# Patient Record
Sex: FEMALE | Race: WHITE | NOT HISPANIC OR LATINO | Employment: OTHER | RURAL
[De-identification: names, ages, dates, MRNs, and addresses within clinical notes are randomized per-mention and may not be internally consistent; named-entity substitution may affect disease eponyms.]

---

## 2020-12-09 ENCOUNTER — HISTORICAL (OUTPATIENT)
Dept: ADMINISTRATIVE | Facility: HOSPITAL | Age: 70
End: 2020-12-09

## 2020-12-10 LAB
INSULIN SERPL-ACNC: NORMAL U[IU]/ML

## 2023-01-09 ENCOUNTER — OFFICE VISIT (OUTPATIENT)
Dept: GASTROENTEROLOGY | Facility: CLINIC | Age: 73
End: 2023-01-09
Payer: MEDICARE

## 2023-01-09 VITALS
HEIGHT: 66 IN | WEIGHT: 163.19 LBS | SYSTOLIC BLOOD PRESSURE: 126 MMHG | OXYGEN SATURATION: 98 % | BODY MASS INDEX: 26.23 KG/M2 | HEART RATE: 68 BPM | DIASTOLIC BLOOD PRESSURE: 55 MMHG

## 2023-01-09 DIAGNOSIS — K92.1 MELENA: ICD-10-CM

## 2023-01-09 PROCEDURE — 99204 OFFICE O/P NEW MOD 45 MIN: CPT | Mod: S$PBB,,, | Performed by: NURSE PRACTITIONER

## 2023-01-09 PROCEDURE — 99213 OFFICE O/P EST LOW 20 MIN: CPT | Mod: PBBFAC | Performed by: NURSE PRACTITIONER

## 2023-01-09 PROCEDURE — 99204 PR OFFICE/OUTPT VISIT, NEW, LEVL IV, 45-59 MIN: ICD-10-PCS | Mod: S$PBB,,, | Performed by: NURSE PRACTITIONER

## 2023-01-09 RX ORDER — IPRATROPIUM BROMIDE 21 UG/1
SPRAY, METERED NASAL
COMMUNITY
Start: 2023-01-04

## 2023-01-09 RX ORDER — AZELASTINE 1 MG/ML
SPRAY, METERED NASAL
COMMUNITY
Start: 2023-01-04

## 2023-01-09 RX ORDER — SIMVASTATIN 40 MG/1
40 TABLET, FILM COATED ORAL EVERY MORNING
COMMUNITY
Start: 2023-01-03

## 2023-01-09 RX ORDER — LOSARTAN POTASSIUM 50 MG/1
50 TABLET ORAL EVERY MORNING
COMMUNITY
Start: 2022-12-06

## 2023-01-09 NOTE — PROGRESS NOTES
"    Estrella Bryant is a 72 y.o. female here for Hemoptysis (X2 at one time; bright; approx 4 weeks ago) and Stool Color Change (Dark; x2 over a 3 week period; last time was 1 week ago)        PCP: Sameer Stock  Referring Provider: No referring provider defined for this encounter.     HPI:  Presents for report of dark stool and "spitting up blood". States that this happened on two occasions in December. Denies epigastric pain, nausea, and vomiting. Denies reflux, with the exception of eating spicy food x 1. Was evaluated by ENT and per pt had a normal laryngoscopy. States the ENT recommended an EGD. Reports a history of gastric ulcer. No EGD found in [facility]. Appetite is good without weight loss. No dysphagia. Recent labs at [place].    Hemoptysis  Pertinent negatives include no abdominal pain, change in bowel habit, fatigue, fever, nausea or vomiting.       ROS:  Review of Systems   Constitutional:  Negative for appetite change, fatigue, fever and unexpected weight change.   HENT:  Negative for trouble swallowing.    Respiratory:  Positive for hemoptysis.    Gastrointestinal:  Negative for abdominal pain, blood in stool (melena), change in bowel habit, constipation, diarrhea, nausea, vomiting, reflux and change in bowel habit.   Integumentary:  Negative for pallor.   Neurological:  Negative for light-headedness.   Psychiatric/Behavioral:  The patient is not nervous/anxious.         PMHX:  has a past medical history of Bleeding stomach ulcer, Headache, High cholesterol, Hypertension, Skin cancer, and Unspecified rotator cuff tear or rupture of unspecified shoulder, not specified as traumatic.    PSHX:  has a past surgical history that includes Hysterectomy; Biopsy; and Tumor removal.    PFHX: family history includes Breast cancer in her maternal aunt and mother; Colon cancer in her father; Heart attack in her paternal uncle; Heart disease in her father and mother; Hyperlipidemia in her brother; Hypertension in her " "brother, father, mother, and sister; Stroke in her paternal grandmother.    PSlHX:  reports that she has quit smoking. Her smoking use included cigarettes. She has a 20.00 pack-year smoking history. She has never used smokeless tobacco. She reports current alcohol use. She reports that she does not use drugs.        Review of patient's allergies indicates:   Allergen Reactions    Penicillins Rash       Medication List with Changes/Refills   Current Medications    AZELASTINE (ASTELIN) 137 MCG (0.1 %) NASAL SPRAY    SMARTSI Spray(s) Both Nares 1-2 Times Daily PRN    IPRATROPIUM (ATROVENT) 21 MCG (0.03 %) NASAL SPRAY    SMARTSI Spray(s) Both Nares Every 6 Hours PRN    LOSARTAN (COZAAR) 50 MG TABLET    Take 50 mg by mouth every morning.    SIMVASTATIN (ZOCOR) 40 MG TABLET    Take 40 mg by mouth every morning.        Objective Findings:  Vital Signs:  BP (!) 126/55   Pulse 68   Ht 5' 6" (1.676 m)   Wt 74 kg (163 lb 3.2 oz)   SpO2 98%   BMI 26.34 kg/m²  Body mass index is 26.34 kg/m².    Physical Exam:  Physical Exam  Vitals and nursing note reviewed.   Constitutional:       General: She is not in acute distress.     Appearance: Normal appearance.   HENT:      Mouth/Throat:      Mouth: Mucous membranes are moist.   Cardiovascular:      Rate and Rhythm: Normal rate.   Pulmonary:      Effort: Pulmonary effort is normal.      Breath sounds: No wheezing, rhonchi or rales.   Abdominal:      General: Bowel sounds are normal. There is no distension.      Palpations: Abdomen is soft. There is no mass.      Tenderness: There is no abdominal tenderness.   Skin:     General: Skin is warm and dry.      Coloration: Skin is not jaundiced or pale.   Neurological:      Mental Status: She is alert and oriented to person, place, and time.   Psychiatric:         Mood and Affect: Mood normal.        Labs:  No results found for: WBC, HGB, HCT, MCV, RDW, PLT, GRAN, LYMPH, MONO, EOS, BASO  No results found for: NA, K, CL, CO2, GLU, " BUN, CREATININE, CALCIUM, PROT, ALBUMIN, BILITOT, ALKPHOS, AST, ALT      Imaging: No results found.      Assessment:  Estrella Bryant is a 72 y.o. female here with:  1. Melena          Recommendations:  1. Schedule EGD  2. Avoid spicy, greasy foods  Avoid caffeine, citric acid, chocolate, peppermint, and carbonated drinks  Do not lay down within 3 hours of eating  Exercise 150 minutes per week  Increase fluid to 64 ounces daily  Avoid antiinflammatory medications such as motrin, advil, aleve, ibuprofen, and BC powder  3. Request labs [place]  4. Labs below today      Follow up in about 3 months (around 4/9/2023).      Order summary:  Orders Placed This Encounter    CBC Auto Differential    Comprehensive Metabolic Panel    EGD       Thank you for allowing me to participate in the care of Estrella Bryant.      YAW WillettC

## 2023-01-27 ENCOUNTER — HOSPITAL ENCOUNTER (OUTPATIENT)
Dept: GASTROENTEROLOGY | Facility: HOSPITAL | Age: 73
Discharge: HOME | End: 2023-01-27
Attending: NURSE PRACTITIONER
Payer: MEDICARE

## 2023-01-27 ENCOUNTER — ANESTHESIA (OUTPATIENT)
Dept: GASTROENTEROLOGY | Facility: HOSPITAL | Age: 73
End: 2023-01-27
Payer: MEDICARE

## 2023-01-27 ENCOUNTER — ANESTHESIA EVENT (OUTPATIENT)
Dept: GASTROENTEROLOGY | Facility: HOSPITAL | Age: 73
End: 2023-01-27
Payer: MEDICARE

## 2023-01-27 VITALS
RESPIRATION RATE: 15 BRPM | HEART RATE: 66 BPM | BODY MASS INDEX: 26.31 KG/M2 | TEMPERATURE: 98 F | WEIGHT: 163 LBS | SYSTOLIC BLOOD PRESSURE: 151 MMHG | OXYGEN SATURATION: 97 % | DIASTOLIC BLOOD PRESSURE: 63 MMHG

## 2023-01-27 DIAGNOSIS — K92.1 MELENA: ICD-10-CM

## 2023-01-27 DIAGNOSIS — K13.79 BLOOD IN MOUTH OF UNKNOWN SOURCE: Primary | ICD-10-CM

## 2023-01-27 DIAGNOSIS — K44.9 HH (HIATUS HERNIA): ICD-10-CM

## 2023-01-27 DIAGNOSIS — K29.00 ACUTE SUPERFICIAL GASTRITIS WITHOUT HEMORRHAGE: ICD-10-CM

## 2023-01-27 DIAGNOSIS — Z87.11 HISTORY OF GASTRIC ULCER: ICD-10-CM

## 2023-01-27 PROCEDURE — 88342 IMHCHEM/IMCYTCHM 1ST ANTB: CPT | Mod: 26,,, | Performed by: PATHOLOGY

## 2023-01-27 PROCEDURE — 37000009 HC ANESTHESIA EA ADD 15 MINS

## 2023-01-27 PROCEDURE — 43239 EGD BIOPSY SINGLE/MULTIPLE: CPT | Mod: ,,, | Performed by: INTERNAL MEDICINE

## 2023-01-27 PROCEDURE — 43239 PR EGD, FLEX, W/BIOPSY, SGL/MULTI: ICD-10-PCS | Mod: ,,, | Performed by: INTERNAL MEDICINE

## 2023-01-27 PROCEDURE — 63600175 PHARM REV CODE 636 W HCPCS: Performed by: NURSE ANESTHETIST, CERTIFIED REGISTERED

## 2023-01-27 PROCEDURE — 37000008 HC ANESTHESIA 1ST 15 MINUTES

## 2023-01-27 PROCEDURE — 43239 EGD BIOPSY SINGLE/MULTIPLE: CPT | Performed by: INTERNAL MEDICINE

## 2023-01-27 PROCEDURE — D9220A PRA ANESTHESIA: Mod: ,,, | Performed by: NURSE ANESTHETIST, CERTIFIED REGISTERED

## 2023-01-27 PROCEDURE — 27000284 HC CANNULA NASAL: Performed by: NURSE ANESTHETIST, CERTIFIED REGISTERED

## 2023-01-27 PROCEDURE — 88305 TISSUE EXAM BY PATHOLOGIST: CPT | Mod: TC,SUR | Performed by: INTERNAL MEDICINE

## 2023-01-27 PROCEDURE — 88305 TISSUE EXAM BY PATHOLOGIST: CPT | Mod: 26,,, | Performed by: PATHOLOGY

## 2023-01-27 PROCEDURE — 88342 SURGICAL PATHOLOGY: ICD-10-PCS | Mod: 26,,, | Performed by: PATHOLOGY

## 2023-01-27 PROCEDURE — 27201423 OPTIME MED/SURG SUP & DEVICES STERILE SUPPLY

## 2023-01-27 PROCEDURE — 25000003 PHARM REV CODE 250: Performed by: NURSE ANESTHETIST, CERTIFIED REGISTERED

## 2023-01-27 PROCEDURE — D9220A PRA ANESTHESIA: ICD-10-PCS | Mod: ,,, | Performed by: NURSE ANESTHETIST, CERTIFIED REGISTERED

## 2023-01-27 PROCEDURE — 88305 SURGICAL PATHOLOGY: ICD-10-PCS | Mod: 26,,, | Performed by: PATHOLOGY

## 2023-01-27 RX ORDER — PROPOFOL 10 MG/ML
VIAL (ML) INTRAVENOUS
Status: DISCONTINUED | OUTPATIENT
Start: 2023-01-27 | End: 2023-01-27

## 2023-01-27 RX ORDER — FENTANYL CITRATE 50 UG/ML
INJECTION, SOLUTION INTRAMUSCULAR; INTRAVENOUS
Status: DISCONTINUED | OUTPATIENT
Start: 2023-01-27 | End: 2023-01-27

## 2023-01-27 RX ORDER — LIDOCAINE HYDROCHLORIDE 20 MG/ML
INJECTION INTRAVENOUS
Status: DISCONTINUED | OUTPATIENT
Start: 2023-01-27 | End: 2023-01-27

## 2023-01-27 RX ORDER — SODIUM CHLORIDE 0.9 % (FLUSH) 0.9 %
10 SYRINGE (ML) INJECTION
Status: CANCELLED | OUTPATIENT
Start: 2023-01-27

## 2023-01-27 RX ADMIN — PROPOFOL 50 MG: 10 INJECTION, EMULSION INTRAVENOUS at 09:01

## 2023-01-27 RX ADMIN — FENTANYL CITRATE 50 MCG: 50 INJECTION INTRAMUSCULAR; INTRAVENOUS at 09:01

## 2023-01-27 RX ADMIN — LIDOCAINE HYDROCHLORIDE 50 MG: 20 INJECTION, SOLUTION INTRAVENOUS at 09:01

## 2023-01-27 NOTE — ANESTHESIA PREPROCEDURE EVALUATION
2023  Estrella Bryant is a 72 y.o., female.      Pre-op Assessment    I have reviewed the Patient Summary Reports.     I have reviewed the Nursing Notes. I have reviewed the NPO Status.   I have reviewed the Medications.     Review of Systems  Anesthesia Hx:  No problems with previous Anesthesia      Past Medical History:   Diagnosis Date    Bleeding stomach ulcer     Headache     High cholesterol     Hypertension     Skin cancer     Unspecified rotator cuff tear or rupture of unspecified shoulder, not specified as traumatic     left       Past Surgical History:   Procedure Laterality Date    BIOPSY      cone    EYE SURGERY Bilateral     cataract bilateral    HYSTERECTOMY      TUMOR REMOVAL             Social History     Socioeconomic History    Marital status:    Tobacco Use    Smoking status: Former     Packs/day: 1.00     Years: 20.00     Pack years: 20.00     Types: Cigarettes    Smokeless tobacco: Never   Substance and Sexual Activity    Alcohol use: Yes     Comment: rarely    Drug use: Never       Current Outpatient Medications   Medication Sig Dispense Refill    azelastine (ASTELIN) 137 mcg (0.1 %) nasal spray SMARTSI Spray(s) Both Nares 1-2 Times Daily PRN      ipratropium (ATROVENT) 21 mcg (0.03 %) nasal spray SMARTSI Spray(s) Both Nares Every 6 Hours PRN      losartan (COZAAR) 50 MG tablet Take 50 mg by mouth every morning.      simvastatin (ZOCOR) 40 MG tablet Take 40 mg by mouth every morning.       No current facility-administered medications for this encounter.       Review of patient's allergies indicates:   Allergen Reactions    Penicillins Rash and Other (See Comments)     seizure       Physical Exam  General: Well nourished    Airway:  Mallampati: II   Mouth Opening: Normal  TM Distance: Normal  Tongue: Normal  Neck ROM: Normal  ROM    Dental:  Intact        Anesthesia Plan  Type of Anesthesia, risks & benefits discussed:    Anesthesia Type: Gen Natural Airway  Intra-op Monitoring Plan: Standard ASA Monitors  Post Op Pain Control Plan: multimodal analgesia  Induction:  IV  Informed Consent: Informed consent signed with the Patient and all parties understand the risks and agree with anesthesia plan.  All questions answered. Patient consented to blood products? Yes  ASA Score: 2  Day of Surgery Review of History & Physical: H&P Update referred to the surgeon/provider.    Ready For Surgery From Anesthesia Perspective.     .       numerical 0-10

## 2023-01-27 NOTE — ANESTHESIA POSTPROCEDURE EVALUATION
Anesthesia Post Evaluation    Patient: Estrella Bryant    Procedure(s) Performed: endoscopy    Final Anesthesia Type: general      Patient location during evaluation: GI PACU  Patient participation: Yes- Able to Participate  Level of consciousness: awake and alert  Post-procedure vital signs: reviewed and stable  Pain management: adequate  Airway patency: patent    PONV status at discharge: No PONV  Anesthetic complications: no      Cardiovascular status: stable  Respiratory status: unassisted, spontaneous ventilation and room air  Hydration status: euvolemic  Follow-up not needed.          Vitals Value Taken Time   /63 01/27/23 0938   Temp 36.7 °C (98 °F) 01/27/23 0919   Pulse 67 01/27/23 0943   Resp 21 01/27/23 0943   SpO2 98 % 01/27/23 0943   Vitals shown include unvalidated device data.      Event Time   Out of Recovery 09:47:50         Pain/Cindy Score: Cindy Score: 8 (1/27/2023  9:21 AM)

## 2023-01-27 NOTE — H&P
[facility] - Endoscopy  Gastroenterology  H&P    Patient Name: Estrella Bryant  MRN: [id number]  Admission Date: 1/27/2023  Code Status: No Order    Attending Provider: NICK Diallo   Primary Care Physician: Sameer Stock MD  Principal Problem:<principal problem not specified>    Subjective:     History of Present Illness: Pt saw blood in her throat and has history of gastric ulcers; for egd.    Past Medical History:   Diagnosis Date    Bleeding stomach ulcer     Headache     High cholesterol     Hypertension     Skin cancer     Unspecified rotator cuff tear or rupture of unspecified shoulder, not specified as traumatic     left       Past Surgical History:   Procedure Laterality Date    BIOPSY      cone    EYE SURGERY Bilateral     cataract bilateral    HYSTERECTOMY      TUMOR REMOVAL         Review of patient's allergies indicates:   Allergen Reactions    Penicillins Rash and Other (See Comments)     seizure     Family History       Problem Relation (Age of Onset)    Breast cancer Mother, Maternal Aunt    Colon cancer Father    Heart attack Paternal Uncle    Heart disease Mother, Father    Hyperlipidemia Brother    Hypertension Mother, Father, Sister, Brother    Stroke Paternal Grandmother          Tobacco Use    Smoking status: Former     Packs/day: 1.00     Years: 20.00     Pack years: 20.00     Types: Cigarettes    Smokeless tobacco: Never   Substance and Sexual Activity    Alcohol use: Yes     Comment: rarely    Drug use: Never    Sexual activity: Not on file     Review of Systems   Respiratory: Negative.     Cardiovascular: Negative.    Gastrointestinal: Negative.    Objective:     Vital Signs (Most Recent):  Pulse: 79 (01/27/23 0741)  Resp: 14 (01/27/23 0741)  BP: (!) 168/76 (01/27/23 0741)  SpO2: 99 % (01/27/23 0741)   Vital Signs (24h Range):  Pulse:  [79] 79  Resp:  [14] 14  SpO2:  [99 %] 99 %  BP: (168)/(76) 168/76     Weight: 73.9 kg (163 lb) (01/27/23 0741)  Body mass index is 26.31  kg/m².    No intake or output data in the 24 hours ending 01/27/23 0910    Lines/Drains/Airways       Peripheral Intravenous Line  Duration                  Peripheral IV - Single Lumen 01/27/23 0740 22 G Anterior;Left Wrist <1 day                    Physical Exam  Vitals reviewed.   Constitutional:       General: She is not in acute distress.     Appearance: Normal appearance. She is well-developed. She is not ill-appearing.   HENT:      Head: Normocephalic and atraumatic.      Nose: Nose normal.   Eyes:      Pupils: Pupils are equal, round, and reactive to light.   Cardiovascular:      Rate and Rhythm: Normal rate and regular rhythm.   Pulmonary:      Effort: Pulmonary effort is normal.      Breath sounds: Normal breath sounds. No wheezing.   Abdominal:      General: Abdomen is flat. Bowel sounds are normal. There is no distension.      Palpations: Abdomen is soft.      Tenderness: There is no abdominal tenderness. There is no guarding.   Skin:     General: Skin is warm and dry.      Coloration: Skin is not jaundiced.   Neurological:      Mental Status: She is alert.   Psychiatric:         Attention and Perception: Attention normal.         Mood and Affect: Affect normal.         Speech: Speech normal.         Behavior: Behavior is cooperative.      Comments: Pt was calm while speaking.       Significant Labs:  CBC: No results for input(s): WBC, HGB, HCT, PLT in the last 48 hours.  CMP: No results for input(s): GLU, CALCIUM, ALBUMIN, PROT, NA, K, CO2, CL, BUN, CREATININE, ALKPHOS, ALT, AST, BILITOT in the last 48 hours.    Significant Imaging:  Imaging results within the past 24 hours have been reviewed.    Assessment/Plan:     There are no hospital problems to display for this patient.        Imp: hemoptysis, history of gastric ulcer  Plan: egd    Robby Ingram MD  Gastroenterology  [facility] - Endoscopy

## 2023-01-27 NOTE — TRANSFER OF CARE
Anesthesia Transfer of Care Note    Patient: Estrella Bryant    Procedure(s) Performed: * No procedures listed *    Patient location: GI    Anesthesia Type: general    Transport from OR: Transported from OR on room air with adequate spontaneous ventilation. Continuous ECG monitoring in transport. Continuous SpO2 monitoring in transport    Post pain: adequate analgesia    Post assessment: no apparent anesthetic complications and tolerated procedure well    Post vital signs: stable    Level of consciousness: responds to stimulation    Nausea/Vomiting: no nausea/vomiting    Complications: none    Transfer of care protocol was followed      Last vitals:   Visit Vitals  BP (!) 126/57   Pulse 65   Temp 36.7 °C (98 °F) (Skin)   Resp 14   Wt 73.9 kg (163 lb)   SpO2 100%   Breastfeeding No   BMI 26.31 kg/m²

## 2023-01-27 NOTE — DISCHARGE INSTRUCTIONS
Procedure Date  1/27/23     Impression  Overall Impression: Mucosa of the esophagus is normal, overlying a 2cm hiatus hernia. The distal esophagus was biopsied. The stomach has gastritis and biopsies were obtained. The duodenum has normal mucosa.     Recommendation    Await pathology results      Avoid nsaids; H2RA or PPI as needed if gerd symptoms recur.     Disp: DC to home in stable condition. Resume diet, no driving x 24hours. F/U with PCP as scheduled.     Dx: hiatus hernia, gastritis, history of gastric ulcer, recent episode of oral bleeding.    No driving today, no operating heavy machinery, no signing any legal documents until tomorrow.    Drink lots of fluids, resume regular diet.  Take your normal medications.

## 2023-01-30 ENCOUNTER — TELEPHONE (OUTPATIENT)
Dept: GASTROENTEROLOGY | Facility: CLINIC | Age: 73
End: 2023-01-30
Payer: MEDICARE

## 2023-01-30 LAB
ESTROGEN SERPL-MCNC: NORMAL PG/ML
INSULIN SERPL-ACNC: NORMAL U[IU]/ML
LAB AP GROSS DESCRIPTION: NORMAL
LAB AP LABORATORY NOTES: NORMAL
T3RU NFR SERPL: NORMAL %

## 2023-01-30 NOTE — TELEPHONE ENCOUNTER
Called patient to discuss results and verbalized understanding.      ----- Message from Robby Ingram MD sent at 1/30/2023  2:43 PM CST -----  EGD bx shows reflux esophagitis without H.pylori.

## 2023-04-10 ENCOUNTER — OFFICE VISIT (OUTPATIENT)
Dept: GASTROENTEROLOGY | Facility: CLINIC | Age: 73
End: 2023-04-10
Payer: MEDICARE

## 2023-04-10 VITALS
SYSTOLIC BLOOD PRESSURE: 149 MMHG | HEART RATE: 64 BPM | HEIGHT: 66 IN | WEIGHT: 166.81 LBS | DIASTOLIC BLOOD PRESSURE: 63 MMHG | OXYGEN SATURATION: 98 % | BODY MASS INDEX: 26.81 KG/M2

## 2023-04-10 DIAGNOSIS — K29.00 ACUTE SUPERFICIAL GASTRITIS WITHOUT HEMORRHAGE: Primary | ICD-10-CM

## 2023-04-10 DIAGNOSIS — K44.9 HH (HIATUS HERNIA): ICD-10-CM

## 2023-04-10 PROCEDURE — 99214 PR OFFICE/OUTPT VISIT, EST, LEVL IV, 30-39 MIN: ICD-10-PCS | Mod: S$PBB,,, | Performed by: NURSE PRACTITIONER

## 2023-04-10 PROCEDURE — 99214 OFFICE O/P EST MOD 30 MIN: CPT | Mod: S$PBB,,, | Performed by: NURSE PRACTITIONER

## 2023-04-10 PROCEDURE — 99214 OFFICE O/P EST MOD 30 MIN: CPT | Mod: PBBFAC | Performed by: NURSE PRACTITIONER

## 2023-04-10 NOTE — PROGRESS NOTES
"    Estrella Bryant is a 72 y.o. female here for Follow-up        PCP: Sameer Stock  Referring Provider: No referring provider defined for this encounter.     HPI:  Presents for follow-up after EGD.  EGD on 01/27/2023, report reviewed, 2 cm hiatal hernia, evidence of some reflux esophagitis.  Stomach biopsies are negative.  No sign of bleeding was noted in the stomach or esophagus to explain patient's report of "spitting up blood".  No further complaint of this in the office today.  Was evaluated by ENT and per pt had a normal laryngoscopy.  Denies heartburn, dysphagia, hematochezia or melena.  CBC on 01/09/2023, report reviewed, no anemia.  CMP reviewed, liver enzymes are not elevated. She does not feel that she needs maintenance medication for reflux.  We did discuss the patient's last colonoscopy.  Last colonoscopy 12/09/2020, report reviewed, tubular adenoma removed with recommendation to repeat colonoscopy in 5 years.    Follow-up  Pertinent negatives include no abdominal pain, change in bowel habit, chest pain, fatigue, fever, nausea or vomiting.       ROS:  Review of Systems   Constitutional:  Negative for appetite change, fatigue, fever and unexpected weight change.   HENT:  Negative for trouble swallowing.    Cardiovascular:  Negative for chest pain.   Gastrointestinal:  Positive for reflux. Negative for abdominal pain, blood in stool, change in bowel habit, constipation, diarrhea, nausea, vomiting and change in bowel habit.   Musculoskeletal:  Negative for gait problem.   Integumentary:  Negative for pallor.   Psychiatric/Behavioral:  The patient is not nervous/anxious.         PMHX:  has a past medical history of Bleeding stomach ulcer, Headache, High cholesterol, Hypertension, Skin cancer, and Unspecified rotator cuff tear or rupture of unspecified shoulder, not specified as traumatic.    PSHX:  has a past surgical history that includes Hysterectomy; Biopsy; Tumor removal; and Eye surgery " "(Bilateral).    PFHX: family history includes Breast cancer in her maternal aunt and mother; Colon cancer in her father; Heart attack in her paternal uncle; Heart disease in her father and mother; Hyperlipidemia in her brother; Hypertension in her brother, father, mother, and sister; Stroke in her paternal grandmother.    PSlHX:  reports that she has quit smoking. Her smoking use included cigarettes. She has a 20.00 pack-year smoking history. She has never used smokeless tobacco. She reports current alcohol use. She reports that she does not use drugs.        Review of patient's allergies indicates:   Allergen Reactions    Penicillins Rash and Other (See Comments)     seizure       Medication List with Changes/Refills   Current Medications    AZELASTINE (ASTELIN) 137 MCG (0.1 %) NASAL SPRAY    SMARTSI Spray(s) Both Nares 1-2 Times Daily PRN    IPRATROPIUM (ATROVENT) 21 MCG (0.03 %) NASAL SPRAY    SMARTSI Spray(s) Both Nares Every 6 Hours PRN    LOSARTAN (COZAAR) 50 MG TABLET    Take 50 mg by mouth every morning.    SIMVASTATIN (ZOCOR) 40 MG TABLET    Take 40 mg by mouth every morning.        Objective Findings:  Vital Signs:  BP (!) 149/63 (BP Location: Right arm)   Pulse 64   Ht 5' 6" (1.676 m)   Wt 75.7 kg (166 lb 12.8 oz)   SpO2 98%   BMI 26.92 kg/m²  Body mass index is 26.92 kg/m².    Physical Exam:  Physical Exam  Vitals and nursing note reviewed.   Constitutional:       General: She is not in acute distress.     Appearance: Normal appearance.   HENT:      Mouth/Throat:      Mouth: Mucous membranes are moist.   Cardiovascular:      Rate and Rhythm: Normal rate.   Pulmonary:      Effort: Pulmonary effort is normal.   Abdominal:      General: Bowel sounds are normal. There is no distension.      Palpations: Abdomen is soft. There is no mass.      Tenderness: There is no abdominal tenderness. There is no guarding.   Skin:     General: Skin is warm and dry.      Coloration: Skin is not jaundiced or pale. "   Neurological:      Mental Status: She is alert and oriented to person, place, and time.   Psychiatric:         Mood and Affect: Mood normal.        Labs:  Lab Results   Component Value Date    WBC 5.01 01/09/2023    HGB 13.9 01/09/2023    HCT 42.8 01/09/2023    MCV 95.5 01/09/2023    RDW 12.3 01/09/2023     01/09/2023    LYMPH 22.6 (L) 01/09/2023    LYMPH 1.13 01/09/2023    MONO 6.8 (H) 01/09/2023    EOS 0.09 01/09/2023    BASO 0.03 01/09/2023     Lab Results   Component Value Date     01/09/2023    K 4.5 01/09/2023     (H) 01/09/2023    CO2 30 01/09/2023    GLU 92 01/09/2023    BUN 14 01/09/2023    CREATININE 0.84 01/09/2023    CALCIUM 10.2 (H) 01/09/2023    PROT 7.6 01/09/2023    ALBUMIN 4.2 01/09/2023    BILITOT 0.5 01/09/2023    ALKPHOS 72 01/09/2023    AST 18 01/09/2023    ALT 27 01/09/2023         Imaging: No results found.      Assessment:  Estrella Bryant is a 72 y.o. female here with:  1. Acute superficial gastritis without hemorrhage    2. HH (hiatus hernia)          Recommendations:  1. Avoid spicy, greasy foods  Avoid caffeine, citric acid, chocolate, peppermint, and carbonated drinks  Do not lay down within 3 hours of eating  Exercise 150 minutes per week  Increase fluid to 64 ounces daily  Avoid antiinflammatory medications such as motrin, advil, aleve, ibuprofen, and BC powder       Follow up in about 1 year (around 4/10/2024).      Order summary:       Thank you for allowing me to participate in the care of Estrella Bryant.      DAVID Willett